# Patient Record
Sex: MALE | Race: BLACK OR AFRICAN AMERICAN | NOT HISPANIC OR LATINO | Employment: UNEMPLOYED | URBAN - METROPOLITAN AREA
[De-identification: names, ages, dates, MRNs, and addresses within clinical notes are randomized per-mention and may not be internally consistent; named-entity substitution may affect disease eponyms.]

---

## 2018-01-08 ENCOUNTER — ALLSCRIPTS OFFICE VISIT (OUTPATIENT)
Dept: OTHER | Facility: OTHER | Age: 43
End: 2018-01-08

## 2018-01-10 NOTE — CONSULTS
Assessment   1  GERD (gastroesophageal reflux disease) (530 81) (K21 9)    Plan   GERD (gastroesophageal reflux disease)    · EGD; Status:Active; Requested [id number]; Perform:[facility]; STERLING:[id number]; Last Updated [place] Post; 2018 11:30:50 AM;Ordered; For:GERD (gastroesophageal reflux disease); Ordered By:Ahmet Sánchez;    Discussion/Summary   Discussion Summary:    1  GERD: Longstanding, improvement with pantoprazole 40 mg, continue this for now  Patient was explained about the lifestyle and dietary modifications  Advised to avoid fatty foods, chocolates, caffeine, alcohol and any other triggering foods  Avoid eating for at least 3 hours before going to bed  Avoid NSAIDs  Given chronicity of the symptoms, will evaluate for Tena's esophagus  Will schedule EGD today  Chief Complaint   Chief Complaint Free Text Note Form: GERD      History of Present Illness   HPI: 43-year-old male who comes in with complains of epigastric abdominal pain and heartburn for the past 6 months here for initial evaluation  Patient states that he has had symptoms of acid reflux for a long time however they had gotten increasingly worse in the past 6 months  He was given PPI several months ago with significant improvement symptoms  He states that he takes pantoprazole on as-needed basis now  He denies dysphagia, hematemesis, melena or hematochezia  No unintentional weight loss  He states that he does occasionally take NSAIDs  Review of Systems   Complete-Male GI Adult:      Constitutional: No fever or chills, feels well, no tiredness, no recent weight gain or weight loss  Eyes: No complaints of eye pain, no red eyes, no discharge from eyes, no itchy eyes  ENT: no complaints of earache, no hearing loss, no nosebleeds, no nasal discharge, no sore throat, no hoarseness        Cardiovascular: No complaints of slow heart rate, no fast heart rate, no chest pain, no palpitations, no leg claudication, no lower extremity  Respiratory: No complaints of shortness of breath, no wheezing, no cough, no SOB on exertion, no orthopnea or PND  Gastrointestinal: as noted in HPI  Genitourinary: No complaints of dysuria, no incontinence, no hesitancy, no nocturia, no genital lesion, no testicular pain  Musculoskeletal: No complaints of arthralgia, no myalgias, no joint swelling or stiffness, no limb pain or swelling  Integumentary: No complaints of skin rash or skin lesions, no itching, no skin wound, no dry skin  Neurological: No compliants of headache, no confusion, no convulsions, no numbness or tingling, no dizziness or fainting, no limb weakness, no difficulty walking  Psychiatric: Is not suicidal, no sleep disturbances, no anxiety or depression, no change in personality, no emotional problems  Endocrine: No complaints of proptosis, no hot flashes, no muscle weakness, no erectile dysfunction, no deepening of the voice, no feelings of weakness  Hematologic/Lymphatic: No complaints of swollen glands, no swollen glands in the neck, does not bleed easily, no easy bruising  ROS Reviewed:    ROS reviewed  Active Problems   1  GERD (gastroesophageal reflux disease) (530 81) (K21 9)   2  Hemorrhoids (455 6) (K64 9)    Past Medical History   1  History of hypertension (V12 59) (Z86 79)  Active Problems And Past Medical History Reviewed: The active problems and past medical history were reviewed and updated today  Surgical History   Surgical History Reviewed: The surgical history was reviewed and updated today  Family History   Mother    1  Denied: Family history of colon cancer   2  Denied: Family history of colonic polyps   3  Denied: Family history of liver disease  Father    4  Denied: Family history of colon cancer   5  Denied: Family history of colonic polyps   6  Denied: Family history of liver disease  Family History Reviewed:     The family history was reviewed and updated today  Social History    · No alcohol use   · Non-smoker (V49 89) (Z78 9)  Social History Reviewed: The social history was reviewed and updated today  Current Meds    1  Lisinopril 20 MG Oral Tablet; Therapy: ([id number]) to Recorded  Medication List Reviewed: The medication list was reviewed and updated today  Allergies   1  No Known Drug Allergies    Vitals   Vital Signs    Recorded: [id number] 10:55AM   Temperature 96 7 F   Heart Rate 141   Systolic 777   Diastolic 72   Weight 908 lb    O2 Saturation 98     Physical Exam        Constitutional      General appearance: No acute distress, well appearing and well nourished  Eyes      Conjunctiva and lids: No swelling, erythema, or discharge  Ears, Nose, Mouth, and Throat      Oropharynx: Normal with no erythema, edema, exudate or lesions  Pulmonary      Respiratory effort: No increased work of breathing or signs of respiratory distress  Auscultation of lungs: Clear to auscultation, equal breath sounds bilaterally, no wheezes, no rales, no rhonci  Cardiovascular      Palpation of heart: Normal PMI, no thrills  Auscultation of heart: Normal rate and rhythm, normal S1 and S2, without murmurs  Examination of extremities for edema and/or varicosities: Normal        Abdomen      Abdomen: Non-tender, no masses  Liver and spleen: No hepatomegaly or splenomegaly  Lymphatic      Palpation of lymph nodes in neck: No lymphadenopathy  Skin      Skin and subcutaneous tissue: Normal without rashes or lesions  Psychiatric      Orientation to person, place and time: Normal        Mood and affect: Normal           Future Appointments      Date/Time Provider Specialty Site   02/05/2018 10:45 AM ABDULLAHI Eldridge   Gastroenterology Adult [address]     Signatures    Electronically signed by : ABDULLAHI Nayak ; Jan 9 2018  3:07PM EST                       (Author)

## 2018-01-22 ENCOUNTER — ANESTHESIA EVENT (OUTPATIENT)
Dept: PERIOP | Facility: AMBULARY SURGERY CENTER | Age: 43
End: 2018-01-22

## 2018-01-23 VITALS
TEMPERATURE: 96.7 F | DIASTOLIC BLOOD PRESSURE: 72 MMHG | WEIGHT: 230 LBS | SYSTOLIC BLOOD PRESSURE: 142 MMHG | OXYGEN SATURATION: 98 % | HEART RATE: 102 BPM

## 2018-01-23 NOTE — CONSULTS
Chief Complaint  GERD      History of Present Illness  60-year-old male who comes in with complains of epigastric abdominal pain and heartburn for the past 6 months here for initial evaluation  Patient states that he has had symptoms of acid reflux for a long time however they had gotten increasingly worse in the past 6 months  He was given PPI several months ago with significant improvement symptoms  He states that he takes pantoprazole on as-needed basis now  He denies dysphagia, hematemesis, melena or hematochezia  No unintentional weight loss  He states that he does occasionally take NSAIDs  Review of Systems    Constitutional: No fever or chills, feels well, no tiredness, no recent weight gain or weight loss  Eyes: No complaints of eye pain, no red eyes, no discharge from eyes, no itchy eyes  ENT: no complaints of earache, no hearing loss, no nosebleeds, no nasal discharge, no sore throat, no hoarseness  Cardiovascular: No complaints of slow heart rate, no fast heart rate, no chest pain, no palpitations, no leg claudication, no lower extremity  Respiratory: No complaints of shortness of breath, no wheezing, no cough, no SOB on exertion, no orthopnea or PND  Gastrointestinal: as noted in HPI  Genitourinary: No complaints of dysuria, no incontinence, no hesitancy, no nocturia, no genital lesion, no testicular pain  Musculoskeletal: No complaints of arthralgia, no myalgias, no joint swelling or stiffness, no limb pain or swelling  Integumentary: No complaints of skin rash or skin lesions, no itching, no skin wound, no dry skin  Neurological: No compliants of headache, no confusion, no convulsions, no numbness or tingling, no dizziness or fainting, no limb weakness, no difficulty walking  Psychiatric: Is not suicidal, no sleep disturbances, no anxiety or depression, no change in personality, no emotional problems     Endocrine: No complaints of proptosis, no hot flashes, no muscle weakness, no erectile dysfunction, no deepening of the voice, no feelings of weakness  Hematologic/Lymphatic: No complaints of swollen glands, no swollen glands in the neck, does not bleed easily, no easy bruising  ROS reviewed  Active Problems    1  GERD (gastroesophageal reflux disease) (530 81) (K21 9)   2  Hemorrhoids (455 6) (K64 9)    Past Medical History    · History of hypertension (V12 59) (Z86 79)    The active problems and past medical history were reviewed and updated today  Surgical History    The surgical history was reviewed and updated today  Family History    · Denied: Family history of colon cancer   · Denied: Family history of colonic polyps   · Denied: Family history of liver disease    · Denied: Family history of colon cancer   · Denied: Family history of colonic polyps   · Denied: Family history of liver disease    The family history was reviewed and updated today  Social History    · No alcohol use   · Non-smoker (V49 89) (Z78 9)  The social history was reviewed and updated today  Current Meds   1  Lisinopril 20 MG Oral Tablet; Therapy: ([id number] 20) to Recorded    The medication list was reviewed and updated today  Allergies    1  No Known Drug Allergies    Vitals   Recorded: [id number] 10:55AM   Temperature 96 7 F   Heart Rate 233   Systolic 042   Diastolic 72   Weight 620 lb    O2 Saturation 98     Physical Exam    Constitutional   General appearance: No acute distress, well appearing and well nourished  Eyes   Conjunctiva and lids: No swelling, erythema, or discharge  Ears, Nose, Mouth, and Throat   Oropharynx: Normal with no erythema, edema, exudate or lesions  Pulmonary   Respiratory effort: No increased work of breathing or signs of respiratory distress  Auscultation of lungs: Clear to auscultation, equal breath sounds bilaterally, no wheezes, no rales, no rhonci  Cardiovascular   Palpation of heart: Normal PMI, no thrills  Auscultation of heart: Normal rate and rhythm, normal S1 and S2, without murmurs  Examination of extremities for edema and/or varicosities: Normal     Abdomen   Abdomen: Non-tender, no masses  Liver and spleen: No hepatomegaly or splenomegaly  Lymphatic   Palpation of lymph nodes in neck: No lymphadenopathy  Skin   Skin and subcutaneous tissue: Normal without rashes or lesions  Psychiatric   Orientation to person, place and time: Normal     Mood and affect: Normal          Assessment    1  GERD (gastroesophageal reflux disease) (530 81) (K21 9)    Plan  GERD (gastroesophageal reflux disease)    · EGD; Status:Active; Requested ZTM:[id number]; Perform:[facility]; TZW:[id number]; Last Updated Elijah Milder; 1/8/2018 11:30:50 AM;Ordered; For:GERD (gastroesophageal reflux disease); Ordered By:Ahmet Sánchez;    Discussion/Summary    1  GERD: Longstanding, improvement with pantoprazole 40 mg, continue this for now  Patient was explained about the lifestyle and dietary modifications  Advised to avoid fatty foods, chocolates, caffeine, alcohol and any other triggering foods  Avoid eating for at least 3 hours before going to bed  2  Avoid NSAIDs  3  Given chronicity of the symptoms, will evaluate for Tena's esophagus  Will schedule EGD today        Future Appointments    Signatures   Electronically signed by : ABDULLAHI Valdez ; Jan 9 2018  3:07PM EST                       (Author)

## 2018-02-05 ENCOUNTER — ANESTHESIA (OUTPATIENT)
Dept: PERIOP | Facility: AMBULARY SURGERY CENTER | Age: 43
End: 2018-02-05

## 2018-04-17 ENCOUNTER — PREP FOR PROCEDURE (OUTPATIENT)
Dept: GASTROENTEROLOGY | Facility: AMBULARY SURGERY CENTER | Age: 43
End: 2018-04-17

## 2018-04-17 DIAGNOSIS — K21.9 GASTROESOPHAGEAL REFLUX DISEASE, ESOPHAGITIS PRESENCE NOT SPECIFIED: Primary | ICD-10-CM

## 2018-04-23 ENCOUNTER — ANESTHESIA EVENT (OUTPATIENT)
Dept: PERIOP | Facility: AMBULARY SURGERY CENTER | Age: 43
End: 2018-04-23
Payer: COMMERCIAL

## 2018-05-04 ENCOUNTER — HOSPITAL ENCOUNTER (OUTPATIENT)
Facility: AMBULARY SURGERY CENTER | Age: 43
Setting detail: OUTPATIENT SURGERY
Discharge: HOME | End: 2018-05-04
Attending: INTERNAL MEDICINE | Admitting: INTERNAL MEDICINE
Payer: COMMERCIAL

## 2018-05-04 ENCOUNTER — ANESTHESIA (OUTPATIENT)
Dept: PERIOP | Facility: AMBULARY SURGERY CENTER | Age: 43
End: 2018-05-04
Payer: COMMERCIAL

## 2018-05-04 VITALS
WEIGHT: 241.25 LBS | OXYGEN SATURATION: 100 % | SYSTOLIC BLOOD PRESSURE: 106 MMHG | BODY MASS INDEX: 41.19 KG/M2 | HEIGHT: 64 IN | DIASTOLIC BLOOD PRESSURE: 54 MMHG | RESPIRATION RATE: 16 BRPM | HEART RATE: 90 BPM | TEMPERATURE: 98.5 F

## 2018-05-04 DIAGNOSIS — K21.9 GASTROESOPHAGEAL REFLUX DISEASE, ESOPHAGITIS PRESENCE NOT SPECIFIED: ICD-10-CM

## 2018-05-04 PROCEDURE — 88342 IMHCHEM/IMCYTCHM 1ST ANTB: CPT | Performed by: PATHOLOGY

## 2018-05-04 PROCEDURE — 88305 TISSUE EXAM BY PATHOLOGIST: CPT | Performed by: PATHOLOGY

## 2018-05-04 PROCEDURE — 43239 EGD BIOPSY SINGLE/MULTIPLE: CPT | Performed by: INTERNAL MEDICINE

## 2018-05-04 RX ORDER — LEVOTHYROXINE SODIUM 175 UG/1
175 TABLET ORAL
COMMUNITY
Start: 2018-02-21

## 2018-05-04 RX ORDER — LIDOCAINE HYDROCHLORIDE 10 MG/ML
INJECTION, SOLUTION INFILTRATION; PERINEURAL AS NEEDED
Status: DISCONTINUED | OUTPATIENT
Start: 2018-05-04 | End: 2018-05-04 | Stop reason: SURG

## 2018-05-04 RX ORDER — RANITIDINE 150 MG/1
150 CAPSULE ORAL 2 TIMES DAILY
Qty: 30 CAPSULE | Refills: 3 | Status: SHIPPED | OUTPATIENT
Start: 2018-05-04

## 2018-05-04 RX ORDER — PROPOFOL 10 MG/ML
INJECTION, EMULSION INTRAVENOUS AS NEEDED
Status: DISCONTINUED | OUTPATIENT
Start: 2018-05-04 | End: 2018-05-04 | Stop reason: SURG

## 2018-05-04 RX ORDER — SODIUM CHLORIDE 9 MG/ML
INJECTION, SOLUTION INTRAVENOUS CONTINUOUS PRN
Status: DISCONTINUED | OUTPATIENT
Start: 2018-05-04 | End: 2018-05-04 | Stop reason: SURG

## 2018-05-04 RX ORDER — LISINOPRIL 20 MG/1
30 TABLET ORAL
COMMUNITY

## 2018-05-04 RX ADMIN — PROPOFOL 120 MG: 10 INJECTION, EMULSION INTRAVENOUS at 12:17

## 2018-05-04 RX ADMIN — SODIUM CHLORIDE: 0.9 INJECTION, SOLUTION INTRAVENOUS at 11:30

## 2018-05-04 RX ADMIN — LIDOCAINE HYDROCHLORIDE 100 MG: 10 INJECTION, SOLUTION INFILTRATION; PERINEURAL at 12:17

## 2018-05-04 RX ADMIN — PROPOFOL 30 MG: 10 INJECTION, EMULSION INTRAVENOUS at 12:18

## 2018-05-04 RX ADMIN — PROPOFOL 50 MG: 10 INJECTION, EMULSION INTRAVENOUS at 12:19

## 2018-05-04 NOTE — OP NOTE
ESOPHAGOGASTRODUODENOSCOPY    PROCEDURE: EGD    SEDATION: Monitored anesthesia care, check anesthesia records    ASA Class: 3    INDICATIONS:  GERD  CONSENT:  Informed consent was obtained for the procedure, including sedation after explaining the risks and benefits of the procedure  Risks including but not limited to bleeding, perforation, infection, and missed lesion  PREPARATION:   Telemetry, pulse oximetry, blood pressure were monitored throughout the procedure  Patient was identified by myself both verbally and by visual inspection of ID band  DESCRIPTION:   Patient was placed in the left lateral decubitus position and was sedated with the above medication  The gastroscope was introduced in to the oropharynx and the esophagus was intubated under direct visualization  Scope was passed down the esophagus up to 2nd part of the duodenum  A careful inspection was made as the gastroscope was withdrawn, including a retroflexed view of the stomach; findings and interventions are described below  FINDINGS:    #1  Esophagus-normal appearing esophagus with regular Z-line noted at 39 cm  #2  Stomach-pre-pyloric erythema noted, biopsies obtained to assess for H pylori  Retroflexed view was normal      #3  Duodenum-normal appearing duodenal mucosa within the bulb, duodenal sweep and D2  IMPRESSIONS:      1   Mild gastritis  RECOMMENDATIONS:     1  Can decrease to pantoprazole 20 mg  Continue dietary modifications as previously recommended  2   Weight loss  3   Avoid NSAIDs  4   Anti-reflux diet  5   Follow up biopsy results in 2-3 weeks  COMPLICATIONS:  None; patient tolerated the procedure well            DISPOSITION: PACU           CONDITION: Stable

## 2018-05-04 NOTE — ANESTHESIA POSTPROCEDURE EVALUATION
Post-Op Assessment Note      CV Status:  Stable    Mental Status:  Lethargic    Hydration Status:  Stable and euvolemic    PONV Controlled:  None    Airway Patency:  Patent    Post Op Vitals Reviewed: Yes          Staff: CRNA       Comments: vss          BP  134/60   Temp      Pulse 95   Resp 18   SpO2 94

## 2018-05-04 NOTE — H&P
History and Physical - [facility]  Fatuma Leiva  40 y o  male MRN: [id number]    HPI: Fatuma Leiva  is a 40y o  year old male who presents for evaluation of longstanding symptoms of GERD  Review of Systems    Historical Information   Past Medical History:   Diagnosis Date    Disease of thyroid gland     Hypertension      Past Surgical History:   Procedure Laterality Date    KNEE ARTHROSCOPY      TOTAL THYROIDECTOMY       Social History   History   Alcohol Use No     History   Drug Use No     History   Smoking Status    Former Smoker   Smokeless Tobacco    Never Used     History reviewed  No pertinent family history  Meds/Allergies     Prescriptions Prior to Admission   Medication    levothyroxine 175 mcg tablet    lisinopril (ZESTRIL) 20 mg tablet       Allergies   Allergen Reactions    Penicillins        Objective     Blood pressure (!) 187/83, pulse 88, temperature 98 6 °F (37 °C), temperature source Temporal, resp  rate 18, height 5' 4" (1 626 m), weight 109 kg (241 lb 4 oz), SpO2 100 %  PHYSICAL EXAM    Gen: NAD  CV: RRR  CHEST: Clear  ABD: soft, NT/ND  EXT: no edema  Neuro: AAO      ASSESSMENT/PLAN:  This is a 40y o  year old male here for evaluation of GERD symptoms  PLAN:   Procedure:  EGD

## 2018-05-04 NOTE — ANESTHESIA PREPROCEDURE EVALUATION
Review of Systems/Medical History      No history of anesthetic complications     Cardiovascular  Hypertension ,    Pulmonary    Comment: Former smoker     GI/Hepatic    GERD ,        Kidney stones,        Endo/Other  History of thyroid disease , hypothyroidism,      GYN  Negative gynecology ROS          Hematology  Negative hematology ROS      Musculoskeletal  Negative musculoskeletal ROS        Neurology  Negative neurology ROS      Psychology   Negative psychology ROS              Physical Exam    Airway    Mallampati score: III  TM Distance: >3 FB  Neck ROM: full     Dental       Cardiovascular      Pulmonary      Other Findings        Anesthesia Plan  ASA Score- 2     Anesthesia Type- IV sedation with anesthesia with ASA Monitors  Additional Monitors:   Airway Plan:     Comment: IV sedation,  standard ASA monitors  Risks and benefits discussed with patient; patient consented and agrees to proceed  I saw and evaluated the patient  If seen with CRNA, we have discussed the anesthetic plan and I am in agreement that the plan is appropriate for the patient        Plan Factors-    Induction- intravenous  Postoperative Plan-     Informed Consent- Anesthetic plan and risks discussed with patient  I personally reviewed this patient with the CRNA  Discussed and agreed on the Anesthesia Plan with the CRNA  Oleg Hoffman